# Patient Record
Sex: FEMALE | URBAN - METROPOLITAN AREA
[De-identification: names, ages, dates, MRNs, and addresses within clinical notes are randomized per-mention and may not be internally consistent; named-entity substitution may affect disease eponyms.]

---

## 2019-04-01 ENCOUNTER — OFFICE VISIT (OUTPATIENT)
Dept: URBAN - METROPOLITAN AREA CLINIC 11 | Facility: CLINIC | Age: 29
End: 2019-04-01
Payer: COMMERCIAL

## 2019-04-01 DIAGNOSIS — H35.412 LATTICE DEGENERATION OF RETINA, LEFT EYE: Primary | ICD-10-CM

## 2019-04-01 PROCEDURE — 92134 CPTRZ OPH DX IMG PST SGM RTA: CPT | Performed by: OPHTHALMOLOGY

## 2019-04-01 PROCEDURE — 99203 OFFICE O/P NEW LOW 30 MIN: CPT | Performed by: OPHTHALMOLOGY

## 2019-04-01 PROCEDURE — 99213 OFFICE O/P EST LOW 20 MIN: CPT | Performed by: OPHTHALMOLOGY

## 2019-04-01 ASSESSMENT — INTRAOCULAR PRESSURE
OS: 19
OD: 19

## 2019-04-01 NOTE — IMPRESSION/PLAN
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 Irina Jenkins outter retinal break consistant w/ trauma possibly congenital Plan: OCT ordered and performed today. Discussed diagnosis with patient. The clinical exam is consistent with Lattice Degeneration at this time, no retinal tears or detachment is identified on exam with scleral depression. Retinal detachment warning signs and symptoms were reviewed and the patient was advised to call immediately if experienced. Patient agrees with plan.